# Patient Record
Sex: FEMALE | Race: WHITE | Employment: OTHER | URBAN - METROPOLITAN AREA
[De-identification: names, ages, dates, MRNs, and addresses within clinical notes are randomized per-mention and may not be internally consistent; named-entity substitution may affect disease eponyms.]

---

## 2018-01-16 ENCOUNTER — HOSPITAL ENCOUNTER (OUTPATIENT)
Dept: OTHER | Age: 83
Discharge: HOME | End: 2018-01-16
Attending: INTERNAL MEDICINE | Admitting: INTERNAL MEDICINE

## 2018-01-16 VITALS — HEIGHT: 63 IN | WEIGHT: 245 LBS | BODY MASS INDEX: 43.41 KG/M2

## 2018-01-16 DIAGNOSIS — C50.611 BILATERAL MALIGNANT NEOPLASM OF AXILLARY TAIL OF BREAST IN FEMALE, UNSPECIFIED ESTROGEN RECEPTOR STATUS (HCC): ICD-10-CM

## 2018-01-16 DIAGNOSIS — C50.612 BILATERAL MALIGNANT NEOPLASM OF AXILLARY TAIL OF BREAST IN FEMALE, UNSPECIFIED ESTROGEN RECEPTOR STATUS (HCC): ICD-10-CM

## 2018-01-16 RX ORDER — FLUDEOXYGLUCOSE F 18 200 MCI/ML
13.02 INJECTION, SOLUTION INTRAVENOUS
Status: COMPLETED | OUTPATIENT
Start: 2018-01-16 | End: 2018-01-16

## 2018-01-16 RX ADMIN — FLUDEOXYGLUCOSE F 18 13.02 MILLICURIE: 200 INJECTION, SOLUTION INTRAVENOUS at 07:10

## 2018-01-31 ENCOUNTER — HOSPITAL ENCOUNTER (OUTPATIENT)
Dept: CT IMAGING | Age: 83
Discharge: HOME | End: 2018-01-31
Attending: RADIOLOGY | Admitting: RADIOLOGY

## 2018-01-31 VITALS
HEART RATE: 58 BPM | OXYGEN SATURATION: 95 % | RESPIRATION RATE: 18 BRPM | SYSTOLIC BLOOD PRESSURE: 148 MMHG | BODY MASS INDEX: 42.52 KG/M2 | DIASTOLIC BLOOD PRESSURE: 72 MMHG | TEMPERATURE: 97.6 F | WEIGHT: 240 LBS | HEIGHT: 63 IN

## 2018-01-31 DIAGNOSIS — C79.51 SECONDARY MALIGNANT NEOPLASM OF BONE (HCC): ICD-10-CM

## 2018-01-31 DIAGNOSIS — C79.51 SECONDARY MALIGNANT NEOPLASM OF VERTEBRAE (HCC): ICD-10-CM

## 2018-01-31 LAB
INR BLD: 1.06 (ref 0.85–1.15)
PLATELET # BLD: 145 K/UL (ref 135–450)
PROTHROMBIN TIME: 12 SEC (ref 9.6–13)

## 2018-01-31 RX ORDER — ONDANSETRON 2 MG/ML
4 INJECTION INTRAMUSCULAR; INTRAVENOUS EVERY 8 HOURS PRN
Status: DISCONTINUED | OUTPATIENT
Start: 2018-01-31 | End: 2018-02-01 | Stop reason: HOSPADM

## 2018-01-31 RX ORDER — FENTANYL CITRATE 50 UG/ML
INJECTION, SOLUTION INTRAMUSCULAR; INTRAVENOUS
Status: COMPLETED | OUTPATIENT
Start: 2018-01-31 | End: 2018-01-31

## 2018-01-31 RX ORDER — MIDAZOLAM HYDROCHLORIDE 5 MG/ML
INJECTION INTRAMUSCULAR; INTRAVENOUS
Status: COMPLETED | OUTPATIENT
Start: 2018-01-31 | End: 2018-01-31

## 2018-01-31 RX ORDER — SODIUM CHLORIDE 9 MG/ML
INJECTION, SOLUTION INTRAVENOUS ONCE
Status: DISCONTINUED | OUTPATIENT
Start: 2018-01-31 | End: 2018-02-01 | Stop reason: HOSPADM

## 2018-01-31 RX ADMIN — MIDAZOLAM HYDROCHLORIDE 1 MG: 5 INJECTION INTRAMUSCULAR; INTRAVENOUS at 10:15

## 2018-01-31 RX ADMIN — FENTANYL CITRATE 50 MCG: 50 INJECTION, SOLUTION INTRAMUSCULAR; INTRAVENOUS at 10:22

## 2018-01-31 NOTE — PRE SEDATION
Sedation Pre-Procedure Note    Patient Name: Ivelisse Ceja   YOB: 1931  Room/Bed: Room/bed info not found  Medical Record Number: [id number]  Date: 1/31/2018   Time: 9:58 AM       Indication:  T9 biopsy    Consent: I have discussed with the patient and/or the patient representative the indication, alternatives, and the possible risks and/or complications of the planned procedure and the anesthesia methods. The patient and/or patient representative appear to understand and agree to proceed. Vital Signs:   Vitals:    01/31/18 0811   BP: (!) 146/69   Pulse: 60   Resp: 14   Temp: 98 °F (36.7 °C)   SpO2: 96%       Past Medical History:   has a past medical history of Blood transfusion; Breast cancer; Cataracts; Cellulitis; Cyst of ovary; Diastolic dysfunction; Family history of malignant neoplasm of breast; Gallstones; Hypertension; Hypothyroidism; Malignant neoplasm of breast (female), unspecified site; Osteoarthritis of knee; Rosacea; and Venous insufficiency. Past Surgical History:   has a past surgical history that includes Dental surgery; Appendectomy (1968); Breast biopsy (2/2010); joint replacement (7/2008); joint replacement (7/07); Cataract removal (1/00); and Mastectomy (4/10). Medications:   Scheduled Meds:    Continuous Infusions:    sodium chloride       PRN Meds:   Home Meds:   Prior to Admission medications    Medication Sig Start Date End Date Taking? Authorizing Provider   levothyroxine (SYNTHROID) 200 MCG tablet Take 200 mcg by mouth 2/10/16  Yes Historical Provider, MD   Acetaminophen (TYLENOL 8 HOUR ARTHRITIS PAIN PO) Take by mouth Indications: as needed   Yes Historical Provider, MD   metoprolol (TOPROL XL) 50 MG XL tablet Take 1 tablet by mouth daily. 4/9/15  Yes Kelli Pulido MD   levothyroxine (SYNTHROID) 25 MCG tablet Take 1 tablet by mouth daily. As directed 8/13/14  Yes Kelli Pulido MD   furosemide (LASIX) 20 MG tablet Take 1 tablet by mouth daily.  8/13/14  Yes Yuki Vivar

## 2018-02-08 ENCOUNTER — HOSPITAL ENCOUNTER (OUTPATIENT)
Dept: OTHER | Age: 83
Discharge: HOME | End: 2018-02-08
Attending: INTERNAL MEDICINE | Admitting: INTERNAL MEDICINE

## 2018-02-08 VITALS
BODY MASS INDEX: 42.52 KG/M2 | HEART RATE: 68 BPM | OXYGEN SATURATION: 96 % | HEIGHT: 63 IN | SYSTOLIC BLOOD PRESSURE: 104 MMHG | DIASTOLIC BLOOD PRESSURE: 54 MMHG | TEMPERATURE: 98 F | RESPIRATION RATE: 18 BRPM | WEIGHT: 240 LBS

## 2018-02-08 DIAGNOSIS — C50.111 MALIGNANT NEOPLASM OF CENTRAL PORTION OF RIGHT FEMALE BREAST (HCC): ICD-10-CM

## 2018-02-08 DIAGNOSIS — C50.111 MALIGNANT NEOPLASM OF CENTRAL PORTION OF BOTH BREASTS IN FEMALE, ESTROGEN RECEPTOR NEGATIVE (HCC): ICD-10-CM

## 2018-02-08 DIAGNOSIS — Z17.1 MALIGNANT NEOPLASM OF CENTRAL PORTION OF BOTH BREASTS IN FEMALE, ESTROGEN RECEPTOR NEGATIVE (HCC): ICD-10-CM

## 2018-02-08 DIAGNOSIS — C50.112 MALIGNANT NEOPLASM OF CENTRAL PORTION OF BOTH BREASTS IN FEMALE, ESTROGEN RECEPTOR NEGATIVE (HCC): ICD-10-CM

## 2018-02-08 LAB
INR BLD: 1.04 (ref 0.85–1.15)
PLATELET # BLD: 203 K/UL (ref 135–450)
PROTHROMBIN TIME: 11.7 SEC (ref 9.6–13)

## 2018-02-08 RX ORDER — MIDAZOLAM HYDROCHLORIDE 5 MG/ML
INJECTION INTRAMUSCULAR; INTRAVENOUS
Status: COMPLETED | OUTPATIENT
Start: 2018-02-08 | End: 2018-02-08

## 2018-02-08 RX ORDER — FENTANYL CITRATE 50 UG/ML
INJECTION, SOLUTION INTRAMUSCULAR; INTRAVENOUS
Status: COMPLETED | OUTPATIENT
Start: 2018-02-08 | End: 2018-02-08

## 2018-02-08 RX ADMIN — FENTANYL CITRATE 50 MCG: 50 INJECTION, SOLUTION INTRAMUSCULAR; INTRAVENOUS at 12:16

## 2018-02-08 RX ADMIN — MIDAZOLAM HYDROCHLORIDE 1 MG: 5 INJECTION INTRAMUSCULAR; INTRAVENOUS at 12:16

## 2018-02-08 ASSESSMENT — PAIN SCALES - GENERAL: PAINLEVEL_OUTOF10: 0

## 2018-02-08 ASSESSMENT — PAIN - FUNCTIONAL ASSESSMENT: PAIN_FUNCTIONAL_ASSESSMENT: 0-10

## 2018-02-14 ENCOUNTER — HOSPITAL ENCOUNTER (OUTPATIENT)
Dept: OTHER | Age: 83
Discharge: HOME | End: 2018-02-14
Attending: INTERNAL MEDICINE | Admitting: INTERNAL MEDICINE

## 2018-02-14 VITALS
TEMPERATURE: 97.8 F | DIASTOLIC BLOOD PRESSURE: 51 MMHG | BODY MASS INDEX: 42.52 KG/M2 | HEIGHT: 63 IN | OXYGEN SATURATION: 97 % | SYSTOLIC BLOOD PRESSURE: 124 MMHG | HEART RATE: 63 BPM | WEIGHT: 240 LBS | RESPIRATION RATE: 16 BRPM

## 2018-02-14 DIAGNOSIS — C79.51 BONE METASTASIS (HCC): ICD-10-CM

## 2018-02-14 DIAGNOSIS — C50.212 MALIGNANT NEOPLASM OF UPPER-INNER QUADRANT OF LEFT FEMALE BREAST, UNSPECIFIED ESTROGEN RECEPTOR STATUS (HCC): ICD-10-CM

## 2018-02-14 DIAGNOSIS — C77.5 SECONDARY AND UNSPECIFIED MALIGNANT NEOPLASM OF INTRAPELVIC LYMPH NODES (HCC): ICD-10-CM

## 2018-02-14 RX ORDER — FENTANYL CITRATE 50 UG/ML
INJECTION, SOLUTION INTRAMUSCULAR; INTRAVENOUS
Status: COMPLETED | OUTPATIENT
Start: 2018-02-14 | End: 2018-02-14

## 2018-02-14 RX ORDER — MIDAZOLAM HYDROCHLORIDE 5 MG/ML
INJECTION INTRAMUSCULAR; INTRAVENOUS
Status: COMPLETED | OUTPATIENT
Start: 2018-02-14 | End: 2018-02-14

## 2018-02-14 RX ORDER — SODIUM CHLORIDE 9 MG/ML
INJECTION, SOLUTION INTRAVENOUS ONCE
Status: DISCONTINUED | OUTPATIENT
Start: 2018-02-14 | End: 2018-02-15 | Stop reason: HOSPADM

## 2018-02-14 RX ORDER — HYDROCODONE BITARTRATE AND ACETAMINOPHEN 5; 325 MG/1; MG/1
2 TABLET ORAL EVERY 6 HOURS PRN
COMMUNITY

## 2018-02-14 RX ADMIN — FENTANYL CITRATE 50 MCG: 50 INJECTION, SOLUTION INTRAMUSCULAR; INTRAVENOUS at 09:48

## 2018-02-14 RX ADMIN — MIDAZOLAM HYDROCHLORIDE 1 MG: 5 INJECTION INTRAMUSCULAR; INTRAVENOUS at 09:48

## 2018-02-14 ASSESSMENT — PAIN SCALES - GENERAL: PAINLEVEL_OUTOF10: 0

## 2018-02-14 ASSESSMENT — PAIN - FUNCTIONAL ASSESSMENT: PAIN_FUNCTIONAL_ASSESSMENT: 0-10

## 2018-02-27 ENCOUNTER — HOSPITAL ENCOUNTER (OUTPATIENT)
Dept: OTHER | Age: 83
Discharge: HOME | End: 2018-02-27
Attending: INTERNAL MEDICINE | Admitting: INTERNAL MEDICINE

## 2018-03-14 PROBLEM — E44.0 MODERATE MALNUTRITION (HCC): Status: ACTIVE | Noted: 2018-03-14

## 2018-03-14 PROBLEM — C79.51 BONE METASTASES (HCC): Status: ACTIVE | Noted: 2018-03-14

## 2018-03-14 PROBLEM — D61.818 PANCYTOPENIA (HCC): Status: ACTIVE | Noted: 2018-03-14

## 2018-03-14 PROBLEM — R13.10 DYSPHAGIA: Status: ACTIVE | Noted: 2018-03-14

## 2018-03-14 PROBLEM — I50.9 CHF (CONGESTIVE HEART FAILURE) (HCC): Status: ACTIVE | Noted: 2018-03-14

## 2018-06-19 ENCOUNTER — HOSPITAL ENCOUNTER (OUTPATIENT)
Dept: OTHER | Age: 83
Discharge: HOME | End: 2018-06-19
Attending: INTERNAL MEDICINE | Admitting: INTERNAL MEDICINE

## 2018-06-19 VITALS — BODY MASS INDEX: 40.12 KG/M2 | WEIGHT: 235 LBS | HEIGHT: 64 IN

## 2018-06-19 DIAGNOSIS — C50.111 MALIGNANT NEOPLASM OF CENTRAL PORTION OF RIGHT FEMALE BREAST, UNSPECIFIED ESTROGEN RECEPTOR STATUS (HCC): ICD-10-CM

## 2018-06-19 RX ORDER — FLUDEOXYGLUCOSE F 18 200 MCI/ML
14.32 INJECTION, SOLUTION INTRAVENOUS
Status: COMPLETED | OUTPATIENT
Start: 2018-06-19 | End: 2018-06-19

## 2018-06-19 RX ADMIN — FLUDEOXYGLUCOSE F 18 14.32 MILLICURIE: 200 INJECTION, SOLUTION INTRAVENOUS at 14:18

## 2018-08-11 ENCOUNTER — APPOINTMENT (OUTPATIENT)
Dept: GENERAL RADIOLOGY | Age: 83
DRG: 309 | End: 2018-08-11
Payer: MEDICARE

## 2018-08-11 ENCOUNTER — HOSPITAL ENCOUNTER (INPATIENT)
Age: 83
LOS: 1 days | Discharge: HOME | DRG: 309 | End: 2018-08-12
Attending: EMERGENCY MEDICINE | Admitting: HOSPITALIST
Payer: MEDICARE

## 2018-08-11 DIAGNOSIS — N17.9 ACUTE RENAL FAILURE, UNSPECIFIED ACUTE RENAL FAILURE TYPE (HCC): ICD-10-CM

## 2018-08-11 DIAGNOSIS — I47.1 SUPRAVENTRICULAR TACHYCARDIA (HCC): Primary | ICD-10-CM

## 2018-08-11 PROBLEM — C79.51 BONE METASTASES (HCC): Chronic | Status: ACTIVE | Noted: 2018-03-14

## 2018-08-11 PROBLEM — I50.9 CHF (CONGESTIVE HEART FAILURE) (HCC): Status: RESOLVED | Noted: 2018-03-14 | Resolved: 2018-08-11

## 2018-08-11 PROBLEM — N18.30 CKD (CHRONIC KIDNEY DISEASE) STAGE 3, GFR 30-59 ML/MIN (HCC): Chronic | Status: ACTIVE | Noted: 2018-08-11

## 2018-08-11 PROBLEM — D61.818 PANCYTOPENIA (HCC): Status: RESOLVED | Noted: 2018-03-14 | Resolved: 2018-08-11

## 2018-08-11 PROBLEM — R13.19 ESOPHAGEAL DYSPHAGIA: Chronic | Status: ACTIVE | Noted: 2018-03-14

## 2018-08-11 PROBLEM — R13.19 ESOPHAGEAL DYSPHAGIA: Status: ACTIVE | Noted: 2018-03-14

## 2018-08-11 PROBLEM — E44.0 MODERATE MALNUTRITION (HCC): Status: RESOLVED | Noted: 2018-03-14 | Resolved: 2018-08-11

## 2018-08-11 PROBLEM — I48.0 PAF (PAROXYSMAL ATRIAL FIBRILLATION) (HCC): Status: ACTIVE | Noted: 2018-08-11

## 2018-08-11 LAB
A/G RATIO: 1.1 (ref 1.1–2.2)
ALBUMIN SERPL-MCNC: 2.3 G/DL (ref 3.4–5)
ALBUMIN SERPL-MCNC: 3.2 G/DL (ref 3.4–5)
ALP BLD-CCNC: 55 U/L (ref 40–129)
ALT SERPL-CCNC: 8 U/L (ref 10–40)
ANION GAP SERPL CALCULATED.3IONS-SCNC: 11 MMOL/L (ref 3–16)
ANION GAP SERPL CALCULATED.3IONS-SCNC: 7 MMOL/L (ref 3–16)
APTT: 19.4 SEC (ref 26–36)
AST SERPL-CCNC: 12 U/L (ref 15–37)
BASOPHILS ABSOLUTE: 0.1 K/UL (ref 0–0.2)
BASOPHILS RELATIVE PERCENT: 2.2 %
BILIRUB SERPL-MCNC: 0.3 MG/DL (ref 0–1)
BUN BLDV-MCNC: 17 MG/DL (ref 7–20)
BUN BLDV-MCNC: 26 MG/DL (ref 7–20)
CALCIUM SERPL-MCNC: 7 MG/DL (ref 8.3–10.6)
CALCIUM SERPL-MCNC: 9.4 MG/DL (ref 8.3–10.6)
CHLORIDE BLD-SCNC: 101 MMOL/L (ref 99–110)
CHLORIDE BLD-SCNC: 112 MMOL/L (ref 99–110)
CO2: 21 MMOL/L (ref 21–32)
CO2: 25 MMOL/L (ref 21–32)
CREAT SERPL-MCNC: 1 MG/DL (ref 0.6–1.2)
CREAT SERPL-MCNC: 1.4 MG/DL (ref 0.6–1.2)
EKG ATRIAL RATE: 100 BPM
EKG ATRIAL RATE: 227 BPM
EKG ATRIAL RATE: 78 BPM
EKG DIAGNOSIS: NORMAL
EKG P AXIS: -3 DEGREES
EKG P AXIS: 52 DEGREES
EKG P-R INTERVAL: 194 MS
EKG P-R INTERVAL: 196 MS
EKG Q-T INTERVAL: 234 MS
EKG Q-T INTERVAL: 328 MS
EKG Q-T INTERVAL: 334 MS
EKG QRS DURATION: 104 MS
EKG QRS DURATION: 104 MS
EKG QRS DURATION: 98 MS
EKG QTC CALCULATION (BAZETT): 380 MS
EKG QTC CALCULATION (BAZETT): 409 MS
EKG QTC CALCULATION (BAZETT): 423 MS
EKG R AXIS: 14 DEGREES
EKG R AXIS: 20 DEGREES
EKG R AXIS: 25 DEGREES
EKG T AXIS: 154 DEGREES
EKG T AXIS: 190 DEGREES
EKG T AXIS: 93 DEGREES
EKG VENTRICULAR RATE: 100 BPM
EKG VENTRICULAR RATE: 184 BPM
EKG VENTRICULAR RATE: 78 BPM
EOSINOPHILS ABSOLUTE: 0.1 K/UL (ref 0–0.6)
EOSINOPHILS RELATIVE PERCENT: 3 %
GFR AFRICAN AMERICAN: 43
GFR AFRICAN AMERICAN: >60
GFR NON-AFRICAN AMERICAN: 36
GFR NON-AFRICAN AMERICAN: 52
GLOBULIN: 2.9 G/DL
GLUCOSE BLD-MCNC: 129 MG/DL (ref 70–99)
GLUCOSE BLD-MCNC: 136 MG/DL (ref 70–99)
HCT VFR BLD CALC: 26.8 % (ref 36–48)
HEMOGLOBIN: 9.2 G/DL (ref 12–16)
INR BLD: 1.17 (ref 0.86–1.14)
LACTIC ACID: 1.3 MMOL/L (ref 0.4–2)
LV EF: 55 %
LVEF MODALITY: NORMAL
LYMPHOCYTES ABSOLUTE: 1.2 K/UL (ref 1–5.1)
LYMPHOCYTES RELATIVE PERCENT: 37.1 %
MAGNESIUM: 1.3 MG/DL (ref 1.8–2.4)
MAGNESIUM: 1.6 MG/DL (ref 1.8–2.4)
MCH RBC QN AUTO: 36.3 PG (ref 26–34)
MCHC RBC AUTO-ENTMCNC: 34.4 G/DL (ref 31–36)
MCV RBC AUTO: 105.5 FL (ref 80–100)
MONOCYTES ABSOLUTE: 0.3 K/UL (ref 0–1.3)
MONOCYTES RELATIVE PERCENT: 9.7 %
NEUTROPHILS ABSOLUTE: 1.5 K/UL (ref 1.7–7.7)
NEUTROPHILS RELATIVE PERCENT: 48 %
PDW BLD-RTO: 15.6 % (ref 12.4–15.4)
PHOSPHORUS: 1.9 MG/DL (ref 2.5–4.9)
PLATELET # BLD: 192 K/UL (ref 135–450)
PMV BLD AUTO: 7.4 FL (ref 5–10.5)
POTASSIUM SERPL-SCNC: 3.1 MMOL/L (ref 3.5–5.1)
POTASSIUM SERPL-SCNC: 3.8 MMOL/L (ref 3.5–5.1)
PROTHROMBIN TIME: 13.3 SEC (ref 9.8–13)
RBC # BLD: 2.54 M/UL (ref 4–5.2)
SODIUM BLD-SCNC: 137 MMOL/L (ref 136–145)
SODIUM BLD-SCNC: 140 MMOL/L (ref 136–145)
TOTAL CK: 27 U/L (ref 26–192)
TOTAL PROTEIN: 6.1 G/DL (ref 6.4–8.2)
TROPONIN: <0.01 NG/ML
WBC # BLD: 3.1 K/UL (ref 4–11)

## 2018-08-11 PROCEDURE — 6370000000 HC RX 637 (ALT 250 FOR IP): Performed by: HOSPITALIST

## 2018-08-11 PROCEDURE — 6360000002 HC RX W HCPCS: Performed by: EMERGENCY MEDICINE

## 2018-08-11 PROCEDURE — 6370000000 HC RX 637 (ALT 250 FOR IP): Performed by: INTERNAL MEDICINE

## 2018-08-11 PROCEDURE — 84484 ASSAY OF TROPONIN QUANT: CPT

## 2018-08-11 PROCEDURE — 83605 ASSAY OF LACTIC ACID: CPT

## 2018-08-11 PROCEDURE — 85730 THROMBOPLASTIN TIME PARTIAL: CPT

## 2018-08-11 PROCEDURE — 6360000002 HC RX W HCPCS

## 2018-08-11 PROCEDURE — 80053 COMPREHEN METABOLIC PANEL: CPT

## 2018-08-11 PROCEDURE — 96374 THER/PROPH/DIAG INJ IV PUSH: CPT

## 2018-08-11 PROCEDURE — 93010 ELECTROCARDIOGRAM REPORT: CPT | Performed by: INTERNAL MEDICINE

## 2018-08-11 PROCEDURE — 93005 ELECTROCARDIOGRAM TRACING: CPT | Performed by: INTERNAL MEDICINE

## 2018-08-11 PROCEDURE — 99285 EMERGENCY DEPT VISIT HI MDM: CPT

## 2018-08-11 PROCEDURE — 2580000003 HC RX 258: Performed by: EMERGENCY MEDICINE

## 2018-08-11 PROCEDURE — 85025 COMPLETE CBC W/AUTO DIFF WBC: CPT

## 2018-08-11 PROCEDURE — 93005 ELECTROCARDIOGRAM TRACING: CPT | Performed by: EMERGENCY MEDICINE

## 2018-08-11 PROCEDURE — 6360000002 HC RX W HCPCS: Performed by: INTERNAL MEDICINE

## 2018-08-11 PROCEDURE — 80061 LIPID PANEL: CPT

## 2018-08-11 PROCEDURE — 93306 TTE W/DOPPLER COMPLETE: CPT

## 2018-08-11 PROCEDURE — 96375 TX/PRO/DX INJ NEW DRUG ADDON: CPT

## 2018-08-11 PROCEDURE — 71045 X-RAY EXAM CHEST 1 VIEW: CPT

## 2018-08-11 PROCEDURE — 82550 ASSAY OF CK (CPK): CPT

## 2018-08-11 PROCEDURE — 82746 ASSAY OF FOLIC ACID SERUM: CPT

## 2018-08-11 PROCEDURE — 84443 ASSAY THYROID STIM HORMONE: CPT

## 2018-08-11 PROCEDURE — 82533 TOTAL CORTISOL: CPT

## 2018-08-11 PROCEDURE — 83036 HEMOGLOBIN GLYCOSYLATED A1C: CPT

## 2018-08-11 PROCEDURE — 85610 PROTHROMBIN TIME: CPT

## 2018-08-11 PROCEDURE — 2580000003 HC RX 258: Performed by: HOSPITALIST

## 2018-08-11 PROCEDURE — 2580000003 HC RX 258

## 2018-08-11 PROCEDURE — 96361 HYDRATE IV INFUSION ADD-ON: CPT

## 2018-08-11 PROCEDURE — 2060000000 HC ICU INTERMEDIATE R&B

## 2018-08-11 PROCEDURE — 94761 N-INVAS EAR/PLS OXIMETRY MLT: CPT

## 2018-08-11 PROCEDURE — 83735 ASSAY OF MAGNESIUM: CPT

## 2018-08-11 PROCEDURE — 99223 1ST HOSP IP/OBS HIGH 75: CPT | Performed by: INTERNAL MEDICINE

## 2018-08-11 RX ORDER — SODIUM CHLORIDE 9 MG/ML
INJECTION, SOLUTION INTRAVENOUS
Status: COMPLETED
Start: 2018-08-11 | End: 2018-08-11

## 2018-08-11 RX ORDER — MAGNESIUM SULFATE 1 G/100ML
1 INJECTION INTRAVENOUS PRN
Status: DISCONTINUED | OUTPATIENT
Start: 2018-08-11 | End: 2018-08-12 | Stop reason: HOSPADM

## 2018-08-11 RX ORDER — PANTOPRAZOLE SODIUM 40 MG/1
40 GRANULE, DELAYED RELEASE ORAL
Status: DISCONTINUED | OUTPATIENT
Start: 2018-08-12 | End: 2018-08-12 | Stop reason: HOSPADM

## 2018-08-11 RX ORDER — ADENOSINE 3 MG/ML
INJECTION, SOLUTION INTRAVENOUS
Status: COMPLETED
Start: 2018-08-11 | End: 2018-08-11

## 2018-08-11 RX ORDER — ONDANSETRON 2 MG/ML
4 INJECTION INTRAMUSCULAR; INTRAVENOUS EVERY 6 HOURS PRN
Status: DISCONTINUED | OUTPATIENT
Start: 2018-08-11 | End: 2018-08-12 | Stop reason: HOSPADM

## 2018-08-11 RX ORDER — DOXAZOSIN 2 MG/1
4 TABLET ORAL DAILY
Status: DISCONTINUED | OUTPATIENT
Start: 2018-08-11 | End: 2018-08-12 | Stop reason: HOSPADM

## 2018-08-11 RX ORDER — 0.9 % SODIUM CHLORIDE 0.9 %
1000 INTRAVENOUS SOLUTION INTRAVENOUS ONCE
Status: COMPLETED | OUTPATIENT
Start: 2018-08-11 | End: 2018-08-11

## 2018-08-11 RX ORDER — ASPIRIN 325 MG
325 TABLET ORAL DAILY
Status: DISCONTINUED | OUTPATIENT
Start: 2018-08-11 | End: 2018-08-12 | Stop reason: HOSPADM

## 2018-08-11 RX ORDER — HEPARIN SODIUM 5000 [USP'U]/ML
5000 INJECTION, SOLUTION INTRAVENOUS; SUBCUTANEOUS EVERY 8 HOURS SCHEDULED
Status: DISCONTINUED | OUTPATIENT
Start: 2018-08-12 | End: 2018-08-12 | Stop reason: HOSPADM

## 2018-08-11 RX ORDER — SUCRALFATE 1 G/1
1 TABLET ORAL 4 TIMES DAILY
Status: DISCONTINUED | OUTPATIENT
Start: 2018-08-11 | End: 2018-08-12 | Stop reason: HOSPADM

## 2018-08-11 RX ORDER — ACETAMINOPHEN 325 MG/1
650 TABLET ORAL EVERY 4 HOURS PRN
Status: DISCONTINUED | OUTPATIENT
Start: 2018-08-11 | End: 2018-08-12 | Stop reason: HOSPADM

## 2018-08-11 RX ORDER — LATANOPROST 50 UG/ML
1 SOLUTION/ DROPS OPHTHALMIC NIGHTLY
Status: DISCONTINUED | OUTPATIENT
Start: 2018-08-11 | End: 2018-08-12 | Stop reason: HOSPADM

## 2018-08-11 RX ORDER — POTASSIUM CHLORIDE 7.45 MG/ML
10 INJECTION INTRAVENOUS PRN
Status: DISCONTINUED | OUTPATIENT
Start: 2018-08-11 | End: 2018-08-12 | Stop reason: HOSPADM

## 2018-08-11 RX ORDER — LEVOTHYROXINE SODIUM 0.03 MG/1
25 TABLET ORAL DAILY
Status: DISCONTINUED | OUTPATIENT
Start: 2018-08-11 | End: 2018-08-12 | Stop reason: HOSPADM

## 2018-08-11 RX ORDER — AMIODARONE HYDROCHLORIDE 50 MG/ML
INJECTION, SOLUTION INTRAVENOUS
Status: DISCONTINUED
Start: 2018-08-11 | End: 2018-08-11 | Stop reason: WASHOUT

## 2018-08-11 RX ORDER — SODIUM CHLORIDE 0.9 % (FLUSH) 0.9 %
10 SYRINGE (ML) INJECTION EVERY 12 HOURS SCHEDULED
Status: DISCONTINUED | OUTPATIENT
Start: 2018-08-11 | End: 2018-08-12 | Stop reason: HOSPADM

## 2018-08-11 RX ORDER — METOPROLOL SUCCINATE 50 MG/1
50 TABLET, EXTENDED RELEASE ORAL DAILY
Status: DISCONTINUED | OUTPATIENT
Start: 2018-08-11 | End: 2018-08-12 | Stop reason: HOSPADM

## 2018-08-11 RX ORDER — FLUOCINONIDE 0.5 MG/G
OINTMENT TOPICAL 2 TIMES DAILY
Status: DISCONTINUED | OUTPATIENT
Start: 2018-08-11 | End: 2018-08-12 | Stop reason: HOSPADM

## 2018-08-11 RX ORDER — AMIODARONE HYDROCHLORIDE 200 MG/1
200 TABLET ORAL 2 TIMES DAILY
Status: DISCONTINUED | OUTPATIENT
Start: 2018-08-11 | End: 2018-08-12 | Stop reason: HOSPADM

## 2018-08-11 RX ORDER — DIGOXIN 0.25 MG/ML
500 INJECTION INTRAMUSCULAR; INTRAVENOUS ONCE
Status: CANCELLED | OUTPATIENT
Start: 2018-08-11

## 2018-08-11 RX ORDER — POTASSIUM CHLORIDE 20MEQ/15ML
40 LIQUID (ML) ORAL PRN
Status: DISCONTINUED | OUTPATIENT
Start: 2018-08-11 | End: 2018-08-12 | Stop reason: HOSPADM

## 2018-08-11 RX ORDER — FOLIC ACID 1 MG/1
1 TABLET ORAL DAILY
Status: DISCONTINUED | OUTPATIENT
Start: 2018-08-11 | End: 2018-08-12 | Stop reason: HOSPADM

## 2018-08-11 RX ORDER — POTASSIUM CHLORIDE 20 MEQ/1
40 TABLET, EXTENDED RELEASE ORAL PRN
Status: DISCONTINUED | OUTPATIENT
Start: 2018-08-11 | End: 2018-08-12 | Stop reason: HOSPADM

## 2018-08-11 RX ORDER — AMLODIPINE BESYLATE 5 MG/1
5 TABLET ORAL DAILY
Status: DISCONTINUED | OUTPATIENT
Start: 2018-08-11 | End: 2018-08-12 | Stop reason: HOSPADM

## 2018-08-11 RX ORDER — SODIUM CHLORIDE 0.9 % (FLUSH) 0.9 %
10 SYRINGE (ML) INJECTION PRN
Status: DISCONTINUED | OUTPATIENT
Start: 2018-08-11 | End: 2018-08-12 | Stop reason: HOSPADM

## 2018-08-11 RX ORDER — SODIUM CHLORIDE 9 MG/ML
INJECTION, SOLUTION INTRAVENOUS CONTINUOUS
Status: DISCONTINUED | OUTPATIENT
Start: 2018-08-11 | End: 2018-08-12 | Stop reason: HOSPADM

## 2018-08-11 RX ORDER — HYDROCODONE BITARTRATE AND ACETAMINOPHEN 5; 325 MG/1; MG/1
2 TABLET ORAL EVERY 6 HOURS PRN
Status: DISCONTINUED | OUTPATIENT
Start: 2018-08-11 | End: 2018-08-12 | Stop reason: HOSPADM

## 2018-08-11 RX ORDER — MAGNESIUM SULFATE IN WATER 40 MG/ML
2 INJECTION, SOLUTION INTRAVENOUS ONCE
Status: COMPLETED | OUTPATIENT
Start: 2018-08-11 | End: 2018-08-11

## 2018-08-11 RX ORDER — ADENOSINE 3 MG/ML
12 INJECTION, SOLUTION INTRAVENOUS ONCE
Status: COMPLETED | OUTPATIENT
Start: 2018-08-11 | End: 2018-08-11

## 2018-08-11 RX ADMIN — AMIODARONE HYDROCHLORIDE 200 MG: 200 TABLET ORAL at 20:31

## 2018-08-11 RX ADMIN — SODIUM CHLORIDE 1000 ML: 9 INJECTION, SOLUTION INTRAVENOUS at 05:51

## 2018-08-11 RX ADMIN — SODIUM CHLORIDE 1000 ML: 9 INJECTION, SOLUTION INTRAVENOUS at 03:25

## 2018-08-11 RX ADMIN — AMIODARONE HYDROCHLORIDE 200 MG: 200 TABLET ORAL at 13:34

## 2018-08-11 RX ADMIN — SUCRALFATE 1 G: 1 TABLET ORAL at 20:25

## 2018-08-11 RX ADMIN — ADENOSINE 12 MG: 3 INJECTION, SOLUTION INTRAVENOUS at 03:19

## 2018-08-11 RX ADMIN — AMIODARONE HYDROCHLORIDE 1 MG/MIN: 50 INJECTION, SOLUTION INTRAVENOUS at 03:19

## 2018-08-11 RX ADMIN — MAGNESIUM SULFATE IN WATER 2 G: 40 INJECTION, SOLUTION INTRAVENOUS at 18:17

## 2018-08-11 RX ADMIN — POTASSIUM CHLORIDE 40 MEQ: 1500 TABLET, EXTENDED RELEASE ORAL at 18:17

## 2018-08-11 RX ADMIN — ADENOSINE 6 MG: 3 INJECTION, SOLUTION INTRAVENOUS at 03:05

## 2018-08-11 RX ADMIN — SODIUM CHLORIDE 125 ML: 9 INJECTION, SOLUTION INTRAVENOUS at 11:44

## 2018-08-11 RX ADMIN — METOPROLOL SUCCINATE 50 MG: 50 TABLET, EXTENDED RELEASE ORAL at 11:45

## 2018-08-11 RX ADMIN — SODIUM CHLORIDE, PRESERVATIVE FREE 10 ML: 5 INJECTION INTRAVENOUS at 11:46

## 2018-08-11 RX ADMIN — LEVOTHYROXINE SODIUM 25 MCG: 25 TABLET ORAL at 11:45

## 2018-08-11 RX ADMIN — SUCRALFATE 1 G: 1 TABLET ORAL at 16:14

## 2018-08-11 RX ADMIN — AMLODIPINE BESYLATE 5 MG: 5 TABLET ORAL at 11:46

## 2018-08-11 RX ADMIN — SUCRALFATE 1 G: 1 TABLET ORAL at 11:45

## 2018-08-11 RX ADMIN — AMIODARONE HYDROCHLORIDE 150 MG: 50 INJECTION, SOLUTION INTRAVENOUS at 03:47

## 2018-08-11 RX ADMIN — ADENOSINE 12 MG: 3 INJECTION, SOLUTION INTRAVENOUS at 03:15

## 2018-08-11 RX ADMIN — DOXAZOSIN 4 MG: 2 TABLET ORAL at 11:45

## 2018-08-11 RX ADMIN — ASPIRIN 325 MG: 325 TABLET, COATED ORAL at 11:45

## 2018-08-11 RX ADMIN — LATANOPROST 1 DROP: 50 SOLUTION OPHTHALMIC at 20:26

## 2018-08-11 RX ADMIN — FOLIC ACID 1 MG: 1 TABLET ORAL at 11:45

## 2018-08-11 RX ADMIN — SODIUM CHLORIDE, PRESERVATIVE FREE 10 ML: 5 INJECTION INTRAVENOUS at 20:26

## 2018-08-11 ASSESSMENT — HEART SCORE: ECG: 0

## 2018-08-11 ASSESSMENT — PAIN SCALES - GENERAL: PAINLEVEL_OUTOF10: 0

## 2018-08-11 NOTE — ED PROVIDER NOTES
Emergency Physician Note    Chief Complaint  Chest Pain (started today and pt denies any cardiac history. states a history of chemo )       History of Present Illness  Fidencio Santamaria is a 80 y.o. female who presents to the ED for chest discomfort and lightheadedness. Patient reports for the past week every time she would swallow fluids she would start feeling a \"gurgling\" in her midsternal region. She has a history of esophageal strictures most recent treatment was in March 2018. She is concerned that she might have strictures again. She also reports that what prompted the visit to the ER today was that she started having lightheadedness that appears to be associated with movement and with dulled if she lies down. Patient denies any chest pressure, chest pain, vertigo, back pain, abdominal pain, palpitations. Denies fever, chills, malaise, chest pain, shortness of breath, cough, abdominal pain, nausea, vomiting, diarrhea, headache, sore throat, dysuria, back pain, rash. No palliative/provocative factors. Positives and pertinent negatives as per HPI. All other of the 10 systems were reviewed and are negative. I have reviewed the following from the nursing documentation:      Prior to Admission medications    Medication Sig Start Date End Date Taking? Authorizing Provider   Palbociclib (IBRANCE PO) Take by mouth   Yes Historical Provider, MD   folic acid (FOLVITE) 1 MG tablet Take 1 mg by mouth daily   Yes Historical Provider, MD   sucralfate (CARAFATE) 1 GM tablet Take 1 g by mouth 4 times daily   Yes Historical Provider, MD   pantoprazole sodium (PROTONIX) 40 MG PACK packet Take 40 mg by mouth every morning (before breakfast)   Yes Historical Provider, MD   cyanocobalamin 1000 MCG/ML injection Inject 1,000 mcg into the muscle once   Yes Historical Provider, MD   HYDROcodone-acetaminophen (NORCO) 5-325 MG per tablet Take 2 tablets by mouth every 6 hours as needed for Pain.    Yes Historical Provider, MD   metoprolol (TOPROL XL) 50 MG XL tablet Take 1 tablet by mouth daily. 4/9/15  Yes Ashia Luther MD   levothyroxine (SYNTHROID) 25 MCG tablet Take 1 tablet by mouth daily. As directed 8/13/14  Yes Ashia Luther MD   furosemide (LASIX) 20 MG tablet Take 1 tablet by mouth daily. 8/13/14  Yes Ashia Luther MD   irbesartan-hydrochlorothiazide (AVALIDE) 300-12.5 MG per tablet TAKE 1 TABLET BY MOUTH DAILY 5/30/14  Yes Ashia Luther MD   terazosin (HYTRIN) 5 MG capsule Take 1 capsule by mouth nightly. 4/16/14  Yes Ashia Luther MD   terazosin (HYTRIN) 10 MG capsule Take 1 capsule by mouth nightly. 4/16/14  Yes Ashia Luther MD   amLODIPine (NORVASC) 5 MG tablet Take 1 tablet by mouth daily. 4/16/14  Yes Ashia Luther MD   aspirin 325 MG tablet Take 325 mg by mouth daily. Yes Ashia Luther MD   latanoprost (XALATAN) 0.005 % ophthalmic solution Place 1 drop into both eyes nightly    Historical Provider, MD   fluocinonide (LIDEX) 0.05 % cream Apply topically 2 times daily.  11/25/13   Ashia Luther MD       Allergies as of 08/11/2018 - Review Complete 08/11/2018   Allergen Reaction Noted    Latex  01/16/2018    Percocet [oxycodone-acetaminophen]  07/02/2010    Shellfish allergy Diarrhea 11/25/2013       Past Medical History:   Diagnosis Date    Blood transfusion After 0265,8976    (tested for HepC), after knee replacement    Breast cancer 4/10    bilateral, primary, I infiltrating ductal 0.8 cm, R  infiltrating lobular , 5 cm    Cancer (HCC)     bone mets    Cataracts     Cellulitis     CHF (congestive heart failure) (HCC)     Cyst of ovary     1.5 cm left     Diastolic dysfunction     Family history of malignant neoplasm of breast     Gallstones 4/10 CT chest/abd/pelvis     Hypertension     Hypothyroidism     Malignant neoplasm of breast (female), unspecified site     bilateral    Osteoarthritis of knee     bilateral    Rosacea     Venous insufficiency         Surgical History:   Past Trachea is midline. No stridor. CHEST:  Regular rate and rhythm, no murmurs/rubs/gallops, normal S1/S2, chest wall non-tender. LUNGS:  No respiratory distress. No abdominal retractions, no sternal retractions. Clear to auscultation bilaterally, no wheezing, no rhochi, no rales  ABDOMEN:  Soft, non-tender, non-distended. No guarding and no rebound. No costovertebral angle tenderness to palpation. Normal BS, no organomegaly, no abdominal masses  EXTREMITIES:  Normal range of motion, no edema, no tenderness, no deformity, distal pulses present. Moves extremities x4 with purpose. SKIN: Warm, dry and intact. NEUROLOGIC: Normal mental status. Moving all extremities to command. Alert and oriented x4 without focal deficit or gross sensory deficit. Normal speech. PSYCHIATRIC: Not anxious, normal mood and affect, thoughts are linear and organized, without delusions/hallucinations, responds appropriately to questions      LABS and DIAGNOSTIC RESULTS  EKG  The Ekg interpreted by me shows  normal sinus rhythm with a rate of 78  Axis is   Normal  QTc is  normal  Intervals and Durations are unremarkable. ST Segments: normal  Delta waves, Brugada Syndrome, and Short RI are not present. EKG2   The Ekg interpreted by me shows  supraventricular tachycardia with a rate of 184  Axis is   Normal  QTc is  normal  Intervals and Durations are unremarkable. ST Segments: normal  Delta waves, Brugada Syndrome, and Short RI are not present.  significant change from prior EKG dated today, patient now an SVT        RADIOLOGY  X-RAYS:  I have reviewed radiologic plain film image(s). ALL OTHER NON-PLAIN FILM IMAGES SUCH AS CT, ULTRASOUND AND MRI HAVE BEEN READ BY THE RADIOLOGIST. XR CHEST PORTABLE   Final Result   No acute findings.                 LABS  Results for orders placed or performed during the hospital encounter of 08/11/18   CBC auto differential   Result Value Ref Range    WBC 3.1 (L) 4.0 - 11.0 K/uL    RBC 2.54 (L) 4.00 - 5.20 M/uL    Hemoglobin 9.2 (L) 12.0 - 16.0 g/dL    Hematocrit 26.8 (L) 36.0 - 48.0 %    .5 (H) 80.0 - 100.0 fL    MCH 36.3 (H) 26.0 - 34.0 pg    MCHC 34.4 31.0 - 36.0 g/dL    RDW 15.6 (H) 12.4 - 15.4 %    Platelets 750 235 - 844 K/uL    MPV 7.4 5.0 - 10.5 fL    Neutrophils % 48.0 %    Lymphocytes % 37.1 %    Monocytes % 9.7 %    Eosinophils % 3.0 %    Basophils % 2.2 %    Neutrophils # 1.5 (L) 1.7 - 7.7 K/uL    Lymphocytes # 1.2 1.0 - 5.1 K/uL    Monocytes # 0.3 0.0 - 1.3 K/uL    Eosinophils # 0.1 0.0 - 0.6 K/uL    Basophils # 0.1 0.0 - 0.2 K/uL   Comprehensive metabolic panel   Result Value Ref Range    Sodium 137 136 - 145 mmol/L    Potassium 3.8 3.5 - 5.1 mmol/L    Chloride 101 99 - 110 mmol/L    CO2 25 21 - 32 mmol/L    Anion Gap 11 3 - 16    Glucose 129 (H) 70 - 99 mg/dL    BUN 26 (H) 7 - 20 mg/dL    CREATININE 1.4 (H) 0.6 - 1.2 mg/dL    GFR Non- 36 (A) >60    GFR  43 (A) >60    Calcium 9.4 8.3 - 10.6 mg/dL    Total Protein 6.1 (L) 6.4 - 8.2 g/dL    Alb 3.2 (L) 3.4 - 5.0 g/dL    Albumin/Globulin Ratio 1.1 1.1 - 2.2    Total Bilirubin 0.3 0.0 - 1.0 mg/dL    Alkaline Phosphatase 55 40 - 129 U/L    ALT 8 (L) 10 - 40 U/L    AST 12 (L) 15 - 37 U/L    Globulin 2.9 g/dL   Troponin   Result Value Ref Range    Troponin <0.01 <0.01 ng/mL       PROCEDURES      MEDICAL DECISION MAKING    While I was reevaluating the patient, explaining to her the process I didn't order to rule out a chest pain protocol on her she started having runs of V. tach. Initially the runs of V. tach lasted about 15-20 beats. She was then moved to another room she had another third episode of V. tach that lasted almost 30 beats. When she got into the other room she started having intermittent episodes of SVT that would last approximately 6-10 seconds each time. She then became having a more persistent SVT.   I then treated her with 3 doses of adenosine (6 mg, 12 mg, then 12 mg), each time she would have a few beats of normal sinus rhythm that appears to be without heart block that she go back into SVT. I then started the patient on amiodarone infusion. She is some improvement in her heart rhythm she now has a grade of 127. Throughout all of these episodes of SVT and V. tach patient did not have any symptoms except for maybe one episode of lightheadedness. Denies any chest pain at any point. Discussed with Dr. Zahida French, he agrees with assessment and plan. I spoke with Dr. Chema Betancur. We thoroughly discussed the history, physical exam, laboratory and imaging studies, as well as, emergency department course. Based upon that discussion, we've decided to admit [address] [place] [address] for further observation and evaluation of Ethan Page's chest pain. As I have deemed necessary from their history, physical, and studies, I have considered and evaluated [address] [place] [address] for the following diagnoses:  ACUTE CORONARY SYNDROME, PERICARDIAL TAMPONADE, PNEUMOTHORAX, PULMONARY EMBOLISM, and THORACIC DISSECTION. FINAL IMPRESSION  1. Supraventricular tachycardia ([address]    2. Acute renal failure, unspecified acute renal failure type ([address]      Disposition  Pt is in stable condition upon Admit to CCU/ICU. Please note, critical care time was 20 minutes, exclusive of separately billable procedures and discussion with the family, specifically for management of the SVT initially, direct bedside care, reevaluation, review of records, and consultation. The note was completed using Dragon voice recognition transcription. Every effort was made to ensure accuracy; however, inadvertent transcription errors may be present despite my best efforts to edit errors.     Doris Mar MD  [address] Heredia MD  08/11/18 4711

## 2018-08-11 NOTE — H&P
1.4*   CALCIUM  9.4     Recent Labs      08/11/18   0129   AST  12*   ALT  8*   BILITOT  0.3   ALKPHOS  55     No results for input(s): INR in the last 72 hours. Recent Labs      08/11/18   0129   TROPONINI  <0.01       Urinalysis:    No results found for: Briseida Najera, BACTERIA, RBCUA, BLOODU, [address], GLUCOSEU    Radiology:     EKG:  I have reviewed the EKG with the following interpretation: afib, rvr rate of 184, nl axis, st abn in antlateral and inferior leads    XR CHEST PORTABLE   Final Result   No acute findings. ASSESSMENT:    Active Hospital Problems    Diagnosis Date Noted    SVT (supraventricular tachycardia) ([facility] [I47.1] 08/11/2018    CKD (chronic kidney disease) stage 3, GFR 30-59 ml/min [N18.3] 08/11/2018    Bone metastases ([facility] [C79.51] 03/14/2018    Esophageal dysphagia [R13.10] 03/14/2018    Carotid artery stenosis [I65.29] 08/25/2015    Chronic anemia [D64.9] 04/30/2013    Hx of vitamin B 12 deficiency [E53.8] 11/14/2012    HTN (hypertension) [I10] 08/18/2010    Hypothyroidism [E03.9] 08/18/2010    Chronic diastolic CHF (congestive heart failure) ([facility] [I50.32] 08/18/2010    Hx of breast cancer [C50.919] 07/04/2010         PLAN:    Chest pain- with initial concern for GI(given hx of radiation and prior esoph stricture), however likely VT vs SVT, given adenosine in ER  -started on amio ggt  -cards consulted, apprec recs, switched to po amiodarone  -tele  -trended ashlie    tachcyardia-with vtach and svt  -cards consulted  -tele  amio ggt  Echo ordered    arf- suspect volume depletion  - trial ivfs  -monitored bmp    hypomag- replaced, monitored    htn- stable  -continued norvasc,cardura, bb    Anemia/leukopenia- chronic, stable, likely related to pt's cancer  -defer to outpt mgmt    DVT Prophylaxis: heparin sq  Diet: DIET NO SALT ADDED (3-4 GM);   Code Status: Full Code    PT/OT Eval Status: not ordered    Dispo - pending cards evaluation       Isaac Aquino MD    Thank

## 2018-08-11 NOTE — CONSULTS
[facility]  [phone]        Reason for Consultation/Chief Complaint: \"I have been having chest discomfort. \"    History of Present Illness:  Fidencio Santamaria is a 80 y.o. patient with PMH of breast cancer (metastasized to spine on chemo) who presented to the hospital with complaints of discomfort in the chest. Patient reports for the past week every time she would swallow fluids she would start feeling a \"gurgling\" in her midsternal region. She has a history of esophageal strictures most recent treatment was in March 2018. She is concerned that she might have strictures again. Denies fever, chills, malaise, chest pain, shortness of breath, cough, abdominal pain    Past Medical History:   has a past medical history of Blood transfusion; Breast cancer; Cancer ([address] Cataracts; Cellulitis; CHF (congestive heart failure) ([address] Cyst of ovary; Diastolic dysfunction; Family history of malignant neoplasm of breast; Gallstones; Hypertension; Hypothyroidism; Malignant neoplasm of breast (female), unspecified site; Osteoarthritis of knee; Rosacea; and Venous insufficiency. Surgical History:   has a past surgical history that includes Dental surgery; Appendectomy (1968); Breast biopsy (2/2010); joint replacement (7/2008); joint replacement (7/07); Cataract removal (1/00); Mastectomy (4/10); and Upper gastrointestinal endoscopy (03/14/2018). Social History:   reports that she has never smoked. She has never used smokeless tobacco. She reports that she drinks alcohol. She reports that she does not use drugs. Family History:  family history includes Breast Cancer in her maternal aunt and maternal aunt. Home Medications:  Were reviewed and are listed in nursing record. and/or listed below  Prior to Admission medications    Medication Sig Start Date End Date Taking?  Authorizing Provider   Palbociclib (IBRANCE PO) Take by mouth   Yes Historical Provider, MD   folic acid (FOLVITE) 1 MG tablet Take 1 mg by mouth daily   Yes Historical Provider, MD   sucralfate (CARAFATE) 1 GM tablet Take 1 g by mouth 4 times daily   Yes Historical Provider, MD   pantoprazole sodium (PROTONIX) 40 MG PACK packet Take 40 mg by mouth every morning (before breakfast)   Yes Historical Provider, MD   cyanocobalamin 1000 MCG/ML injection Inject 1,000 mcg into the muscle once   Yes Historical Provider, MD   HYDROcodone-acetaminophen (NORCO) 5-325 MG per tablet Take 2 tablets by mouth every 6 hours as needed for Pain. Yes Historical Provider, MD   metoprolol (TOPROL XL) 50 MG XL tablet Take 1 tablet by mouth daily. 4/9/15  Yes Domingo Pereira MD   levothyroxine (SYNTHROID) 25 MCG tablet Take 1 tablet by mouth daily. As directed 8/13/14  Yes Domingo Pereira MD   furosemide (LASIX) 20 MG tablet Take 1 tablet by mouth daily. 8/13/14  Yes Domingo Pereira MD   irbesartan-hydrochlorothiazide (AVALIDE) 300-12.5 MG per tablet TAKE 1 TABLET BY MOUTH DAILY 5/30/14  Yes Domingo Pereira MD   terazosin (HYTRIN) 5 MG capsule Take 1 capsule by mouth nightly. 4/16/14  Yes Domingo Pereira MD   terazosin (HYTRIN) 10 MG capsule Take 1 capsule by mouth nightly. 4/16/14  Yes Domingo Pereira MD   amLODIPine (NORVASC) 5 MG tablet Take 1 tablet by mouth daily. 4/16/14  Yes Domingo Pereira MD   aspirin 325 MG tablet Take 325 mg by mouth daily. Yes Domingo Pereira MD   latanoprost (XALATAN) 0.005 % ophthalmic solution Place 1 drop into both eyes nightly    Historical Provider, MD        Allergies:  Latex; Percocet [oxycodone-acetaminophen]; and Shellfish allergy     Review of Systems:   All 14 point review of symptoms completed. Pertinent positives identified in the HPI, all other review of symptoms negative as below.     ·     Physical Examination:    Vitals:    08/11/18 0840   BP: (!) 146/58   Pulse: 63   Resp: 17   Temp:    SpO2: 96%    Weight: 242 lb 8.1 oz (110 kg)         General Appearance:  Alert, cooperative, no distress, appears stated age   Head: Normocephalic, without obvious abnormality, atraumatic   Eyes:  PERRL, conjunctiva/corneas clear       Nose: Nares normal, no drainage or sinus tenderness   Throat: Lips, mucosa, and tongue normal   Neck: Supple, symmetrical, trachea midline, no adenopathy, thyroid: not enlarged, symmetric, no tenderness/mass/nodules, no carotid bruit or JVD       Lungs:   Clear to auscultation bilaterally, respirations unlabored   Chest Wall:  No tenderness or deformity   Heart:  Regular rate and rhythm, S1, S2 normal, no murmur, rub or gallop   Abdomen:   Soft, non-tender, bowel sounds active all four quadrants,  no masses, no organomegaly           Extremities: Extremities normal, atraumatic, no cyanosis or edema   Pulses: 2+ and symmetric   Skin: Skin color, texture, turgor normal, no rashes or lesions   Pysch: Normal mood and affect   Neurologic: Normal gross motor and sensory exam.         Labs  CBC:   Lab Results   Component Value Date    WBC 3.1 08/11/2018    RBC 2.54 08/11/2018    HGB 9.2 08/11/2018    HCT 26.8 08/11/2018    .5 08/11/2018    RDW 15.6 08/11/2018     08/11/2018     CMP:    Lab Results   Component Value Date     08/11/2018    K 3.8 08/11/2018    K 3.6 03/14/2018     08/11/2018    CO2 25 08/11/2018    BUN 26 08/11/2018    CREATININE 1.4 08/11/2018    GFRAA 43 08/11/2018    GFRAA 51 04/17/2013    AGRATIO 1.1 08/11/2018    LABGLOM 36 08/11/2018    GLUCOSE 129 08/11/2018    PROT 6.1 08/11/2018    PROT 6.8 04/23/2012    PROT 6.8 04/23/2012    CALCIUM 9.4 08/11/2018    BILITOT 0.3 08/11/2018    ALKPHOS 55 08/11/2018    AST 12 08/11/2018    ALT 8 08/11/2018     PT/INR:  No results found for: PTINR  Lab Results   Component Value Date    TROPONINI <0.01 08/11/2018       EKG:  I have reviewed EKG with the following interpretation:  Impression:  Atrial fibrillationwith rapid ventricular response    Chest X-ray: No acute findings      Assessment  Paroxysmal atrial fibrillation, currently in sinus rhythm. No SVT noted  Wide complex tachycardia due to A. Fib with abberancy vs NSVT  Breast cancer with  Mets to spine: on chemo currently    Plan:    I had the opportunity to review the clinical symptoms and presentation of [address] [place] [address] with Dr. Kristie George    - Change from IV amiodarone to PO Amiodarone 200 BID for 5 days then switch to daily.    - Not a candidate for oral anticoagulation due to anemia and fall risk and undergoing chemotherapy. - Keep Mg > 2.0 and K+ > 4.    - Echo to evaluate EF and left atrial size. Thank you for allowing to us to participate in the care or [address] [place] [address]. Further evaluation will be based upon the patient's clinical course and testing results. Bharti Tellez PGY-2  8/11/2018 1:23 PM  [phone]    I personally took the history and examined the patient, reviewed the chart and labs and formulated the plan.        JACQUELINE Lim MD, [facility] [place]

## 2018-08-11 NOTE — PLAN OF CARE
Problem: Falls - Risk of:  Goal: Will remain free from falls  Will remain free from falls   Pt. Is on fall precautions. Stated understanding.   Goal: Absence of physical injury  Absence of physical injury   Outcome: Met This Shift

## 2018-08-11 NOTE — PROGRESS NOTES
Cardiology called @[phone]  Re: SVT and VT per Heriberto Mondragon returned call to Dr. Kinga Dodson @ [phone]

## 2018-08-12 VITALS
TEMPERATURE: 98.1 F | HEART RATE: 62 BPM | DIASTOLIC BLOOD PRESSURE: 66 MMHG | WEIGHT: 241.8 LBS | OXYGEN SATURATION: 95 % | SYSTOLIC BLOOD PRESSURE: 128 MMHG | RESPIRATION RATE: 16 BRPM | HEIGHT: 64 IN | BODY MASS INDEX: 41.28 KG/M2

## 2018-08-12 LAB
ALBUMIN SERPL-MCNC: 2.8 G/DL (ref 3.4–5)
ANION GAP SERPL CALCULATED.3IONS-SCNC: 10 MMOL/L (ref 3–16)
BASOPHILS ABSOLUTE: 0.1 K/UL (ref 0–0.2)
BASOPHILS RELATIVE PERCENT: 2.3 %
BUN BLDV-MCNC: 20 MG/DL (ref 7–20)
CALCIUM SERPL-MCNC: 8.6 MG/DL (ref 8.3–10.6)
CHLORIDE BLD-SCNC: 106 MMOL/L (ref 99–110)
CHOLESTEROL, TOTAL: 105 MG/DL (ref 0–199)
CO2: 22 MMOL/L (ref 21–32)
CORTISOL - AM: 9.1 UG/DL (ref 4.3–22.4)
CREAT SERPL-MCNC: 1.2 MG/DL (ref 0.6–1.2)
EOSINOPHILS ABSOLUTE: 0.1 K/UL (ref 0–0.6)
EOSINOPHILS RELATIVE PERCENT: 4.5 %
ESTIMATED AVERAGE GLUCOSE: 102.5 MG/DL
FOLATE: >20 NG/ML (ref 4.78–24.2)
GFR AFRICAN AMERICAN: 51
GFR NON-AFRICAN AMERICAN: 43
GLUCOSE BLD-MCNC: 116 MG/DL (ref 70–99)
HBA1C MFR BLD: 5.2 %
HCT VFR BLD CALC: 24.3 % (ref 36–48)
HDLC SERPL-MCNC: 38 MG/DL (ref 40–60)
HEMOGLOBIN: 8.8 G/DL (ref 12–16)
LACTIC ACID: 0.6 MMOL/L (ref 0.4–2)
LDL CHOLESTEROL CALCULATED: 57 MG/DL
LYMPHOCYTES ABSOLUTE: 1.4 K/UL (ref 1–5.1)
LYMPHOCYTES RELATIVE PERCENT: 43.6 %
MAGNESIUM: 2.2 MG/DL (ref 1.8–2.4)
MCH RBC QN AUTO: 37.9 PG (ref 26–34)
MCHC RBC AUTO-ENTMCNC: 36.1 G/DL (ref 31–36)
MCV RBC AUTO: 105.2 FL (ref 80–100)
MONOCYTES ABSOLUTE: 0.2 K/UL (ref 0–1.3)
MONOCYTES RELATIVE PERCENT: 6.7 %
NEUTROPHILS ABSOLUTE: 1.4 K/UL (ref 1.7–7.7)
NEUTROPHILS RELATIVE PERCENT: 42.9 %
PDW BLD-RTO: 15.9 % (ref 12.4–15.4)
PHOSPHORUS: 2.3 MG/DL (ref 2.5–4.9)
PLATELET # BLD: 183 K/UL (ref 135–450)
PMV BLD AUTO: 7.3 FL (ref 5–10.5)
POTASSIUM SERPL-SCNC: 4.3 MMOL/L (ref 3.5–5.1)
PRO-BNP: 1974 PG/ML (ref 0–449)
RBC # BLD: 2.31 M/UL (ref 4–5.2)
SODIUM BLD-SCNC: 138 MMOL/L (ref 136–145)
TOTAL CK: 22 U/L (ref 26–192)
TRIGL SERPL-MCNC: 52 MG/DL (ref 0–150)
TROPONIN: <0.01 NG/ML
TSH REFLEX: 2.45 UIU/ML (ref 0.27–4.2)
VLDLC SERPL CALC-MCNC: 10 MG/DL
WBC # BLD: 3.3 K/UL (ref 4–11)

## 2018-08-12 PROCEDURE — 80069 RENAL FUNCTION PANEL: CPT

## 2018-08-12 PROCEDURE — 36591 DRAW BLOOD OFF VENOUS DEVICE: CPT

## 2018-08-12 PROCEDURE — 85025 COMPLETE CBC W/AUTO DIFF WBC: CPT

## 2018-08-12 PROCEDURE — 6370000000 HC RX 637 (ALT 250 FOR IP): Performed by: INTERNAL MEDICINE

## 2018-08-12 PROCEDURE — 6370000000 HC RX 637 (ALT 250 FOR IP): Performed by: HOSPITALIST

## 2018-08-12 PROCEDURE — 6360000002 HC RX W HCPCS: Performed by: HOSPITALIST

## 2018-08-12 PROCEDURE — 83880 ASSAY OF NATRIURETIC PEPTIDE: CPT

## 2018-08-12 PROCEDURE — 99233 SBSQ HOSP IP/OBS HIGH 50: CPT | Performed by: NURSE PRACTITIONER

## 2018-08-12 PROCEDURE — 83735 ASSAY OF MAGNESIUM: CPT

## 2018-08-12 RX ORDER — POTASSIUM CHLORIDE 750 MG/1
10 TABLET, EXTENDED RELEASE ORAL DAILY
Qty: 30 TABLET | Refills: 5 | Status: SHIPPED | OUTPATIENT
Start: 2018-08-12

## 2018-08-12 RX ORDER — FLUOCINONIDE 0.5 MG/G
OINTMENT TOPICAL
Qty: 1 TUBE | Refills: 0
Start: 2018-08-12 | End: 2018-08-19

## 2018-08-12 RX ORDER — AMIODARONE HYDROCHLORIDE 200 MG/1
TABLET ORAL
Qty: 36 TABLET | Refills: 5 | Status: SHIPPED | OUTPATIENT
Start: 2018-08-12 | End: 2018-09-27 | Stop reason: SDUPTHER

## 2018-08-12 RX ADMIN — DOXAZOSIN 4 MG: 2 TABLET ORAL at 08:44

## 2018-08-12 RX ADMIN — ASPIRIN 325 MG: 325 TABLET, COATED ORAL at 08:44

## 2018-08-12 RX ADMIN — PANTOPRAZOLE SODIUM 40 MG: 40 GRANULE, DELAYED RELEASE ORAL at 05:07

## 2018-08-12 RX ADMIN — AMIODARONE HYDROCHLORIDE 200 MG: 200 TABLET ORAL at 08:44

## 2018-08-12 RX ADMIN — SUCRALFATE 1 G: 1 TABLET ORAL at 08:43

## 2018-08-12 RX ADMIN — AMLODIPINE BESYLATE 5 MG: 5 TABLET ORAL at 08:44

## 2018-08-12 RX ADMIN — FOLIC ACID 1 MG: 1 TABLET ORAL at 08:44

## 2018-08-12 RX ADMIN — HEPARIN SODIUM 5000 UNITS: 5000 INJECTION INTRAVENOUS; SUBCUTANEOUS at 05:07

## 2018-08-12 RX ADMIN — LEVOTHYROXINE SODIUM 25 MCG: 25 TABLET ORAL at 08:44

## 2018-08-12 NOTE — DISCHARGE SUMMARY
Hospital Medicine Discharge Summary    Patient ID: Harman Miles      Patient's PCP: Cruz Ramirez MD    Admit Date: 8/11/2018     Discharge Date: 8/12/2018      Admitting Physician: Nikolas Ann MD     Discharge Physician: Ban Galvan MD     Discharge Diagnoses: Active Hospital Problems    Diagnosis Date Noted    Hypokalemia [E87.6]     SVT (supraventricular tachycardia) (HCC) [I47.1] 08/11/2018    CKD (chronic kidney disease) stage 3, GFR 30-59 ml/min [N18.3] 08/11/2018    PAF (paroxysmal atrial fibrillation) ([facility] [I48.0] 08/11/2018    Bone metastases ([facility] [C79.51] 03/14/2018    Esophageal dysphagia [R13.10] 03/14/2018    Carotid artery stenosis [I65.29] 08/25/2015    Chronic anemia [D64.9] 04/30/2013    Hx of vitamin B 12 deficiency [E53.8] 11/14/2012    HTN (hypertension) [I10] 08/18/2010    Hypothyroidism [E03.9] 08/18/2010    Chronic diastolic CHF (congestive heart failure) ([facility] [I50.32] 08/18/2010    Hx of breast cancer [C50.919] 07/04/2010       The patient was seen and examined on day of discharge and this discharge summary is in conjunction with any daily progress note from day of discharge. Hospital Course:   History Of Present Illness:   80 y.o. female with dchf, htn, brca with mets to bone who presented to [facility] with chest pain. Pt noted this last night when going to bed and progressively worsenen. She noted mild fluttering in her chest and assoc dizziness/lightheadedness and so came in overnight for evaluation. No sob/n/v/sweating and no prior hx of this. In ER, workup was initially unremarkable but then tele noted VTach and SVT. Pt was given adenosine and started on amio ggt. She is currently cp free.       Chest pain- with initial concern for GI(given hx of radiation and prior esoph stricture), however concerns for VT vs SVT, given adenosine in ER  -started on amio ggt  -cards consulted, apprec recs, noted Afib with RVR, switched to po amiodarone(will need BMP, LFT, and TSH every 6 months while taking), f/u in 6-8 weeks  -cards did not rec AC due to age/fall risk/anemia/on chemo  -tele  -trended ashlie     tachcyardia-with concerns for vtach and svt, rather it was afib rvr  -cards consulted  -tele  amio ggt  Echo ordered(ef 55%, g2dd, mod dilated LA, mild pulm htn, no wall motion abn)     arf- suspect volume depletion  - trial ivfs  -monitored bmp     hypomag- replaced, monitored     htn- stable  -continued norvasc,cardura, bb     Anemia/leukopenia- chronic, stable, likely related to pt's cancer  -defer to outpt mgmt       Physical Exam Performed:     /66   Pulse 62   Temp 98.1 °F (36.7 °C) (Oral)   Resp 16   Ht 5' 4\" (1.626 m)   Wt 241 lb 12.8 oz (109.7 kg)   SpO2 95%   BMI 41.50 kg/m²          General appearance:  No apparent distress, appears stated age and cooperative. HEENT:  Normal cephalic, atraumatic without obvious deformity. Pupils equal, round, and reactive to light. Extra ocular muscles intact. Conjunctivae/corneas clear. Neck: Supple, with full range of motion. No jugular venous distention. Trachea midline. Respiratory:  Normal respiratory effort. Clear to auscultation, bilaterally without Rales/Wheezes/Rhonchi. Cardiovascular:  Regular rate and rhythm with normal S1/S2 without murmurs, rubs or gallops. Abdomen: Soft, non-tender, non-distended with normal bowel sounds. Musculoskeletal:  No clubbing, cyanosis, trace LE edema bilaterally. Full range of motion without deformity. Skin: Skin color, texture, turgor normal.  No rashes or lesions. Neurologic:  Neurovascularly intact without any focal sensory/motor deficits. Cranial nerves: II-XII intact, grossly non-focal.  Psychiatric:  Alert and oriented, thought content appropriate, normal insight  Capillary Refill: Brisk,< 3 seconds   Peripheral Pulses: +2 palpable, equal bilaterally       Labs:  For convenience and continuity at follow-up the following most recent labs Pain.Historical Med      metoprolol (TOPROL XL) 50 MG XL tablet Take 1 tablet by mouth daily. , Disp-90 tablet, R-3      levothyroxine (SYNTHROID) 25 MCG tablet Take 1 tablet by mouth daily. As directed, Disp-90 tablet, R-3, VAN      furosemide (LASIX) 20 MG tablet Take 1 tablet by mouth daily. , Disp-90 tablet, R-3      irbesartan-hydrochlorothiazide (AVALIDE) 300-12.5 MG per tablet TAKE 1 TABLET BY MOUTH DAILY, Disp-90 tablet, R-3      terazosin (HYTRIN) 10 MG capsule Take 1 capsule by mouth nightly., Disp-90 capsule, R-3      amLODIPine (NORVASC) 5 MG tablet Take 1 tablet by mouth daily. , Disp-90 tablet, R-3      aspirin 325 MG tablet Take 325 mg by mouth daily. Time Spent on discharge is more than 20 minutes in the examination, evaluation, counseling and review of medications and discharge plan. Signed:    Rossi Real MD   8/12/2018      Thank you Cleotha Lombard, MD for the opportunity to be involved in this patient's care. If you have any questions or concerns please feel free to contact me at [phone].

## 2018-08-12 NOTE — PROGRESS NOTES
Gave pt. Dc instructions. Stated understanding. Left floor via wheelchair to private vehicle. No distress noted.

## 2018-09-27 ENCOUNTER — OFFICE VISIT (OUTPATIENT)
Dept: CARDIOLOGY CLINIC | Age: 83
End: 2018-09-27
Payer: MEDICARE

## 2018-09-27 VITALS
HEIGHT: 64 IN | HEART RATE: 69 BPM | WEIGHT: 237.5 LBS | DIASTOLIC BLOOD PRESSURE: 56 MMHG | OXYGEN SATURATION: 90 % | BODY MASS INDEX: 40.55 KG/M2 | SYSTOLIC BLOOD PRESSURE: 120 MMHG

## 2018-09-27 DIAGNOSIS — I10 ESSENTIAL HYPERTENSION: Chronic | ICD-10-CM

## 2018-09-27 DIAGNOSIS — I50.32 CHRONIC DIASTOLIC CHF (CONGESTIVE HEART FAILURE) (HCC): Chronic | ICD-10-CM

## 2018-09-27 DIAGNOSIS — I48.0 PAF (PAROXYSMAL ATRIAL FIBRILLATION) (HCC): Primary | ICD-10-CM

## 2018-09-27 PROCEDURE — 1036F TOBACCO NON-USER: CPT | Performed by: NURSE PRACTITIONER

## 2018-09-27 PROCEDURE — 99214 OFFICE O/P EST MOD 30 MIN: CPT | Performed by: NURSE PRACTITIONER

## 2018-09-27 PROCEDURE — 1090F PRES/ABSN URINE INCON ASSESS: CPT | Performed by: NURSE PRACTITIONER

## 2018-09-27 PROCEDURE — 93000 ELECTROCARDIOGRAM COMPLETE: CPT | Performed by: NURSE PRACTITIONER

## 2018-09-27 PROCEDURE — 1123F ACP DISCUSS/DSCN MKR DOCD: CPT | Performed by: NURSE PRACTITIONER

## 2018-09-27 PROCEDURE — 4040F PNEUMOC VAC/ADMIN/RCVD: CPT | Performed by: NURSE PRACTITIONER

## 2018-09-27 PROCEDURE — G8417 CALC BMI ABV UP PARAM F/U: HCPCS | Performed by: NURSE PRACTITIONER

## 2018-09-27 PROCEDURE — G8427 DOCREV CUR MEDS BY ELIG CLIN: HCPCS | Performed by: NURSE PRACTITIONER

## 2018-09-27 PROCEDURE — 1101F PT FALLS ASSESS-DOCD LE1/YR: CPT | Performed by: NURSE PRACTITIONER

## 2018-09-27 PROCEDURE — G8598 ASA/ANTIPLAT THER USED: HCPCS | Performed by: NURSE PRACTITIONER

## 2018-09-27 RX ORDER — AMIODARONE HYDROCHLORIDE 200 MG/1
200 TABLET ORAL DAILY
Qty: 90 TABLET | Refills: 0 | Status: SHIPPED | OUTPATIENT
Start: 2018-09-27 | End: 2018-12-11 | Stop reason: SDUPTHER

## 2018-09-27 NOTE — PROGRESS NOTES
[address]   Cardiology Note              Date:  September 27, 2018  Patient name: Josi Palma  YOB: 1931    Primary Care physician: Sagar Min MD    HISTORY OF PRESENT ILLNESS: The patient is an 80 y.o.  female with a history of paroxysmal atrial fibrillation with aberrant conduction, HTN, chronic diastolic CHF, anemia, leukopenia, and breast cancer with bone mets. She was admitted in 8/2018 with chest pain and lightheadedness. EKG showed atrial fibrillation with a HR of 184, some aberrant conduction noted. She was given multiple dose of adenosine and was started on IV amiodarone. She spontaneously converted to sinus. Echo in 8/2018 showed an EF of 55%. Today she is being seen for paroxysmal atrial fibrillation. EKG shows SR with a HR of 68. She complains of significant fatigue and is taking a break from chemo due to side effects. Denies chest pain, palpitations, shortness of breath, and dizziness. Past Medical History:   has a past medical history of Blood transfusion; Breast cancer; Cancer ([address] Cataracts; Cellulitis; CHF (congestive heart failure) ([address] Cyst of ovary; Diastolic dysfunction; Family history of malignant neoplasm of breast; Gallstones; Hypertension; Hypothyroidism; Malignant neoplasm of breast (female), unspecified site; Osteoarthritis of knee; Rosacea; and Venous insufficiency. Past Surgical History:   has a past surgical history that includes Dental surgery; Appendectomy (1968); Breast biopsy (2/2010); joint replacement (7/2008); joint replacement (7/07); Cataract removal (1/00); Mastectomy (4/10); and Upper gastrointestinal endoscopy (03/14/2018). Home Medications:    Prior to Admission medications    Medication Sig Start Date End Date Taking?  Authorizing Provider   amiodarone (CORDARONE) 200 MG tablet Twice a day for 3 days then decrease to 200mg once per day (on 8/16/2018) 8/12/18  Yes KRISTIN Kenyon CNP   folic acid Constitutional: + fatigue   HENT: Negative. Eyes: Negative. Respiratory: Negative. Cardiovascular: see HPI  Gastrointestinal: Negative. Genitourinary: Negative. Musculoskeletal: Negative. Skin: Negative. Neurological: Negative. Hematological: Negative. Psychiatric/Behavioral: Negative. PHYSICAL EXAM:    Physical Examination:    BP (!) 120/56   Pulse 69   Ht 5' 4\" (1.626 m)   Wt 237 lb 8 oz (107.7 kg)   SpO2 90%   BMI 40.77 kg/m²      Constitutional and general appearance: alert, cooperative, no distress and appears stated age  HEENT: PERRL, no cervical lymphadenopathy. No masses palpable. Normal oral mucosa  Respiratory:  · Normal excursion and expansion without use of accessory muscles  · Resp auscultation: Normal breath sounds without dullness or wheezing  Cardiovascular:  · The apical impulse is not displaced  · Heart tones are crisp and normal. regular S1 and S2.  · Jugular venous pulsation Normal  · The carotid upstroke is normal in amplitude and contour without delay or bruit  · Peripheral pulses are symmetrical and full   Abdomen:  · No masses or tenderness  · Bowel sounds present  Extremities:  ·  No cyanosis or clubbing  ·  No lower extremity edema  ·  Skin: warm and dry  Neurological:  · Alert and oriented  · Moves all extremities well  · No abnormalities of mood, affect, memory, mentation, or behavior are noted    DATA:    ECG 9/27/2018:  SR HR 68    Echo 8/11/2018:   Left ventricular systolic function is normal with ejection fraction estimated at 55%.   No regional wall motion abnormalities.   Grade II diastolic dysfunction with elevated left ventricular filling pressure.   Moderately dilated left atrium.   Systolic pulmonary artery pressure (SPAP) is estimated at 41 mmHg consistent with mild pulmonary hypertension (Right atrial pressure of 8 mmHg). CARDIOLOGY LABS:   CBC: No results for input(s): WBC, HGB, HCT, PLT in the last 72 hours.   BMP: No results for

## 2018-09-28 RX ORDER — LEVOTHYROXINE SODIUM 0.03 MG/1
225 TABLET ORAL DAILY
Qty: 90 TABLET | Refills: 3 | Status: SHIPPED
Start: 2018-09-28

## 2018-12-14 RX ORDER — AMIODARONE HYDROCHLORIDE 200 MG/1
TABLET ORAL
Qty: 90 TABLET | Refills: 0 | Status: SHIPPED | OUTPATIENT
Start: 2018-12-14 | End: 2019-03-18 | Stop reason: SDUPTHER

## 2019-03-18 RX ORDER — AMIODARONE HYDROCHLORIDE 200 MG/1
TABLET ORAL
Qty: 90 TABLET | Refills: 1 | Status: SHIPPED | OUTPATIENT
Start: 2019-03-18

## 2019-03-20 LAB
A/G RATIO: 1.3
ALBUMIN SERPL-MCNC: 3 G/DL
ALP BLD-CCNC: 61 U/L
ALT SERPL-CCNC: 7 U/L
AST SERPL-CCNC: 12 U/L
BILIRUB SERPL-MCNC: 0.4 MG/DL (ref 0.1–1.4)
BILIRUBIN DIRECT: 0.1 MG/DL
BILIRUBIN, INDIRECT: NORMAL
BUN BLDV-MCNC: 22 MG/DL
CALCIUM SERPL-MCNC: 8.6 MG/DL
CHLORIDE BLD-SCNC: 101 MMOL/L
CO2: 32 MMOL/L
CREAT SERPL-MCNC: 1.3 MG/DL
GFR CALCULATED: 39
GLOBULIN: NORMAL
GLUCOSE BLD-MCNC: 120 MG/DL
POTASSIUM SERPL-SCNC: 4 MMOL/L
PROTEIN TOTAL: 5.3 G/DL
SODIUM BLD-SCNC: 136 MMOL/L